# Patient Record
Sex: MALE | Race: BLACK OR AFRICAN AMERICAN | NOT HISPANIC OR LATINO | Employment: FULL TIME | URBAN - METROPOLITAN AREA
[De-identification: names, ages, dates, MRNs, and addresses within clinical notes are randomized per-mention and may not be internally consistent; named-entity substitution may affect disease eponyms.]

---

## 2024-11-03 ENCOUNTER — HOSPITAL ENCOUNTER (OUTPATIENT)
Facility: HOSPITAL | Age: 39
Discharge: LEFT BEFORE DISCHARGE | End: 2024-11-03
Attending: EMERGENCY MEDICINE | Admitting: HOSPITALIST
Payer: COMMERCIAL

## 2024-11-03 VITALS
RESPIRATION RATE: 16 BRPM | OXYGEN SATURATION: 98 % | TEMPERATURE: 98 F | DIASTOLIC BLOOD PRESSURE: 53 MMHG | HEART RATE: 73 BPM | HEIGHT: 73 IN | BODY MASS INDEX: 21.74 KG/M2 | SYSTOLIC BLOOD PRESSURE: 93 MMHG | WEIGHT: 164 LBS

## 2024-11-03 DIAGNOSIS — I21.3 STEMI (ST ELEVATION MYOCARDIAL INFARCTION): ICD-10-CM

## 2024-11-03 DIAGNOSIS — R07.9 CHEST PAIN: ICD-10-CM

## 2024-11-03 PROBLEM — E78.5 HLD (HYPERLIPIDEMIA): Chronic | Status: ACTIVE | Noted: 2024-11-03

## 2024-11-03 PROBLEM — E78.5 HLD (HYPERLIPIDEMIA): Status: ACTIVE | Noted: 2024-11-03

## 2024-11-03 PROBLEM — F31.60 BIPOLAR 1 DISORDER, MIXED: Status: ACTIVE | Noted: 2024-11-03

## 2024-11-03 PROBLEM — F31.60 BIPOLAR 1 DISORDER, MIXED: Chronic | Status: ACTIVE | Noted: 2024-11-03

## 2024-11-03 PROBLEM — B20 CURRENTLY ASYMPTOMATIC HIV INFECTION, WITH HISTORY OF HIV-RELATED ILLNESS: Status: ACTIVE | Noted: 2024-11-03

## 2024-11-03 PROBLEM — F20.9 SCHIZOPHRENIA: Chronic | Status: ACTIVE | Noted: 2024-11-03

## 2024-11-03 PROBLEM — F20.9 SCHIZOPHRENIA: Status: ACTIVE | Noted: 2024-11-03

## 2024-11-03 PROBLEM — B20 CURRENTLY ASYMPTOMATIC HIV INFECTION, WITH HISTORY OF HIV-RELATED ILLNESS: Chronic | Status: ACTIVE | Noted: 2024-11-03

## 2024-11-03 LAB
ABO + RH BLD: NORMAL
ALBUMIN SERPL BCP-MCNC: 4 G/DL (ref 3.5–5.2)
ALP SERPL-CCNC: 87 U/L (ref 40–150)
ALT SERPL W/O P-5'-P-CCNC: 32 U/L (ref 10–44)
AMPHET+METHAMPHET UR QL: NEGATIVE
ANION GAP SERPL CALC-SCNC: 11 MMOL/L (ref 8–16)
AST SERPL-CCNC: 30 U/L (ref 10–40)
BARBITURATES UR QL SCN>200 NG/ML: NEGATIVE
BASOPHILS # BLD AUTO: 0.08 K/UL (ref 0–0.2)
BASOPHILS NFR BLD: 1.1 % (ref 0–1.9)
BENZODIAZ UR QL SCN>200 NG/ML: NEGATIVE
BILIRUB SERPL-MCNC: 0.5 MG/DL (ref 0.1–1)
BILIRUB UR QL STRIP: NEGATIVE
BLD GP AB SCN CELLS X3 SERPL QL: NORMAL
BNP SERPL-MCNC: 12 PG/ML (ref 0–99)
BUN SERPL-MCNC: 8 MG/DL (ref 6–20)
BZE UR QL SCN: NEGATIVE
CALCIUM SERPL-MCNC: 10 MG/DL (ref 8.7–10.5)
CANNABINOIDS UR QL SCN: NEGATIVE
CHLORIDE SERPL-SCNC: 103 MMOL/L (ref 95–110)
CLARITY UR: CLEAR
CO2 SERPL-SCNC: 30 MMOL/L (ref 23–29)
COLOR UR: YELLOW
CREAT SERPL-MCNC: 1.3 MG/DL (ref 0.5–1.4)
CREAT UR-MCNC: 275 MG/DL (ref 23–375)
DIFFERENTIAL METHOD BLD: ABNORMAL
EOSINOPHIL # BLD AUTO: 0.2 K/UL (ref 0–0.5)
EOSINOPHIL NFR BLD: 2.7 % (ref 0–8)
ERYTHROCYTE [DISTWIDTH] IN BLOOD BY AUTOMATED COUNT: 17.4 % (ref 11.5–14.5)
EST. GFR  (NO RACE VARIABLE): >60 ML/MIN/1.73 M^2
ETHANOL SERPL-MCNC: <10 MG/DL
GLUCOSE SERPL-MCNC: 90 MG/DL (ref 70–110)
GLUCOSE UR QL STRIP: NEGATIVE
HCT VFR BLD AUTO: 46.2 % (ref 40–54)
HGB BLD-MCNC: 15.5 G/DL (ref 14–18)
HGB UR QL STRIP: NEGATIVE
IMM GRANULOCYTES # BLD AUTO: 0.01 K/UL (ref 0–0.04)
IMM GRANULOCYTES NFR BLD AUTO: 0.1 % (ref 0–0.5)
KETONES UR QL STRIP: NEGATIVE
LACTATE SERPL-SCNC: 2 MMOL/L (ref 0.5–2.2)
LEUKOCYTE ESTERASE UR QL STRIP: NEGATIVE
LYMPHOCYTES # BLD AUTO: 3.8 K/UL (ref 1–4.8)
LYMPHOCYTES NFR BLD: 54.2 % (ref 18–48)
MCH RBC QN AUTO: 28 PG (ref 27–31)
MCHC RBC AUTO-ENTMCNC: 33.5 G/DL (ref 32–36)
MCV RBC AUTO: 84 FL (ref 82–98)
METHADONE UR QL SCN>300 NG/ML: NEGATIVE
MONOCYTES # BLD AUTO: 0.6 K/UL (ref 0.3–1)
MONOCYTES NFR BLD: 8.9 % (ref 4–15)
NEUTROPHILS # BLD AUTO: 2.3 K/UL (ref 1.8–7.7)
NEUTROPHILS NFR BLD: 33 % (ref 38–73)
NITRITE UR QL STRIP: NEGATIVE
NRBC BLD-RTO: 0 /100 WBC
OPIATES UR QL SCN: NEGATIVE
PCP UR QL SCN>25 NG/ML: NEGATIVE
PH UR STRIP: 7 [PH] (ref 5–8)
PLATELET # BLD AUTO: 242 K/UL (ref 150–450)
PMV BLD AUTO: 9 FL (ref 9.2–12.9)
POTASSIUM SERPL-SCNC: 3.7 MMOL/L (ref 3.5–5.1)
PROT SERPL-MCNC: 7.1 G/DL (ref 6–8.4)
PROT UR QL STRIP: ABNORMAL
RBC # BLD AUTO: 5.53 M/UL (ref 4.6–6.2)
SODIUM SERPL-SCNC: 144 MMOL/L (ref 136–145)
SP GR UR STRIP: 1.02 (ref 1–1.03)
SPECIMEN OUTDATE: NORMAL
TOXICOLOGY INFORMATION: NORMAL
TROPONIN I SERPL DL<=0.01 NG/ML-MCNC: 0.01 NG/ML (ref 0–0.03)
TROPONIN I SERPL DL<=0.01 NG/ML-MCNC: <0.006 NG/ML (ref 0–0.03)
TROPONIN I SERPL DL<=0.01 NG/ML-MCNC: <0.006 NG/ML (ref 0–0.03)
URN SPEC COLLECT METH UR: ABNORMAL
UROBILINOGEN UR STRIP-ACNC: ABNORMAL EU/DL
WBC # BLD AUTO: 7.05 K/UL (ref 3.9–12.7)

## 2024-11-03 PROCEDURE — 83605 ASSAY OF LACTIC ACID: CPT | Performed by: EMERGENCY MEDICINE

## 2024-11-03 PROCEDURE — 86901 BLOOD TYPING SEROLOGIC RH(D): CPT | Performed by: EMERGENCY MEDICINE

## 2024-11-03 PROCEDURE — 93005 ELECTROCARDIOGRAM TRACING: CPT

## 2024-11-03 PROCEDURE — G0378 HOSPITAL OBSERVATION PER HR: HCPCS

## 2024-11-03 PROCEDURE — 36415 COLL VENOUS BLD VENIPUNCTURE: CPT | Performed by: HOSPITALIST

## 2024-11-03 PROCEDURE — 84484 ASSAY OF TROPONIN QUANT: CPT | Mod: 91 | Performed by: HOSPITALIST

## 2024-11-03 PROCEDURE — 82077 ASSAY SPEC XCP UR&BREATH IA: CPT | Performed by: EMERGENCY MEDICINE

## 2024-11-03 PROCEDURE — 80307 DRUG TEST PRSMV CHEM ANLYZR: CPT | Performed by: EMERGENCY MEDICINE

## 2024-11-03 PROCEDURE — 84484 ASSAY OF TROPONIN QUANT: CPT | Mod: 91 | Performed by: EMERGENCY MEDICINE

## 2024-11-03 PROCEDURE — 80053 COMPREHEN METABOLIC PANEL: CPT | Performed by: EMERGENCY MEDICINE

## 2024-11-03 PROCEDURE — 81003 URINALYSIS AUTO W/O SCOPE: CPT | Mod: 59 | Performed by: EMERGENCY MEDICINE

## 2024-11-03 PROCEDURE — 36415 COLL VENOUS BLD VENIPUNCTURE: CPT | Performed by: EMERGENCY MEDICINE

## 2024-11-03 PROCEDURE — 83880 ASSAY OF NATRIURETIC PEPTIDE: CPT | Performed by: EMERGENCY MEDICINE

## 2024-11-03 PROCEDURE — 93010 ELECTROCARDIOGRAM REPORT: CPT | Mod: ,,, | Performed by: INTERNAL MEDICINE

## 2024-11-03 PROCEDURE — 99285 EMERGENCY DEPT VISIT HI MDM: CPT | Mod: 25

## 2024-11-03 PROCEDURE — 85025 COMPLETE CBC W/AUTO DIFF WBC: CPT | Performed by: EMERGENCY MEDICINE

## 2024-11-03 PROCEDURE — 25000003 PHARM REV CODE 250: Performed by: EMERGENCY MEDICINE

## 2024-11-03 RX ORDER — ONDANSETRON HYDROCHLORIDE 2 MG/ML
4 INJECTION, SOLUTION INTRAVENOUS EVERY 8 HOURS PRN
Status: DISCONTINUED | OUTPATIENT
Start: 2024-11-03 | End: 2024-11-03 | Stop reason: HOSPADM

## 2024-11-03 RX ORDER — AMOXICILLIN 250 MG
1 CAPSULE ORAL 2 TIMES DAILY PRN
Status: DISCONTINUED | OUTPATIENT
Start: 2024-11-03 | End: 2024-11-03 | Stop reason: HOSPADM

## 2024-11-03 RX ORDER — ENOXAPARIN SODIUM 100 MG/ML
40 INJECTION SUBCUTANEOUS EVERY 24 HOURS
Status: DISCONTINUED | OUTPATIENT
Start: 2024-11-03 | End: 2024-11-03 | Stop reason: HOSPADM

## 2024-11-03 RX ORDER — DARUNAVIR, COBICISTAT, EMTRICITABINE, AND TENOFOVIR ALAFENAMIDE 800; 150; 200; 10 MG/1; MG/1; MG/1; MG/1
1 TABLET, FILM COATED ORAL DAILY
COMMUNITY

## 2024-11-03 RX ORDER — PROMETHAZINE HYDROCHLORIDE 25 MG/1
25 TABLET ORAL EVERY 6 HOURS PRN
Status: DISCONTINUED | OUTPATIENT
Start: 2024-11-03 | End: 2024-11-03 | Stop reason: HOSPADM

## 2024-11-03 RX ORDER — FERROUS SULFATE, DRIED 160(50) MG
1 TABLET, EXTENDED RELEASE ORAL 2 TIMES DAILY WITH MEALS
COMMUNITY

## 2024-11-03 RX ORDER — ASPIRIN 325 MG
325 TABLET ORAL
Status: DISCONTINUED | OUTPATIENT
Start: 2024-11-03 | End: 2024-11-03 | Stop reason: HOSPADM

## 2024-11-03 RX ORDER — ACETAMINOPHEN 325 MG/1
650 TABLET ORAL EVERY 8 HOURS PRN
Status: DISCONTINUED | OUTPATIENT
Start: 2024-11-03 | End: 2024-11-03 | Stop reason: HOSPADM

## 2024-11-03 RX ORDER — IBUPROFEN 200 MG
16 TABLET ORAL
Status: DISCONTINUED | OUTPATIENT
Start: 2024-11-03 | End: 2024-11-03 | Stop reason: HOSPADM

## 2024-11-03 RX ORDER — ACETAMINOPHEN 650 MG/1
650 SUPPOSITORY RECTAL EVERY 6 HOURS PRN
Status: DISCONTINUED | OUTPATIENT
Start: 2024-11-03 | End: 2024-11-03 | Stop reason: HOSPADM

## 2024-11-03 RX ORDER — GLUCAGON 1 MG
1 KIT INJECTION
Status: DISCONTINUED | OUTPATIENT
Start: 2024-11-03 | End: 2024-11-03 | Stop reason: HOSPADM

## 2024-11-03 RX ORDER — MORPHINE SULFATE 4 MG/ML
2 INJECTION, SOLUTION INTRAMUSCULAR; INTRAVENOUS EVERY 4 HOURS PRN
Status: DISCONTINUED | OUTPATIENT
Start: 2024-11-03 | End: 2024-11-03 | Stop reason: HOSPADM

## 2024-11-03 RX ORDER — HYDROCODONE BITARTRATE AND ACETAMINOPHEN 5; 325 MG/1; MG/1
1 TABLET ORAL EVERY 6 HOURS PRN
Status: DISCONTINUED | OUTPATIENT
Start: 2024-11-03 | End: 2024-11-03 | Stop reason: HOSPADM

## 2024-11-03 RX ORDER — IPRATROPIUM BROMIDE AND ALBUTEROL SULFATE 2.5; .5 MG/3ML; MG/3ML
3 SOLUTION RESPIRATORY (INHALATION) EVERY 6 HOURS PRN
Status: DISCONTINUED | OUTPATIENT
Start: 2024-11-03 | End: 2024-11-03 | Stop reason: HOSPADM

## 2024-11-03 RX ORDER — IBUPROFEN 200 MG
24 TABLET ORAL
Status: DISCONTINUED | OUTPATIENT
Start: 2024-11-03 | End: 2024-11-03 | Stop reason: HOSPADM

## 2024-11-03 RX ORDER — NALOXONE HCL 0.4 MG/ML
0.02 VIAL (ML) INJECTION
Status: DISCONTINUED | OUTPATIENT
Start: 2024-11-03 | End: 2024-11-03 | Stop reason: HOSPADM

## 2024-11-03 RX ORDER — TALC
6 POWDER (GRAM) TOPICAL NIGHTLY PRN
Status: DISCONTINUED | OUTPATIENT
Start: 2024-11-03 | End: 2024-11-03 | Stop reason: HOSPADM

## 2024-11-03 RX ORDER — ALUMINUM HYDROXIDE, MAGNESIUM HYDROXIDE, AND SIMETHICONE 1200; 120; 1200 MG/30ML; MG/30ML; MG/30ML
30 SUSPENSION ORAL 4 TIMES DAILY PRN
Status: DISCONTINUED | OUTPATIENT
Start: 2024-11-03 | End: 2024-11-03 | Stop reason: HOSPADM

## 2024-11-03 RX ORDER — RISPERIDONE 120 MG
120 KIT SUBCUTANEOUS
COMMUNITY
Start: 2024-06-14

## 2024-11-03 RX ORDER — SODIUM CHLORIDE 0.9 % (FLUSH) 0.9 %
10 SYRINGE (ML) INJECTION EVERY 12 HOURS PRN
Status: DISCONTINUED | OUTPATIENT
Start: 2024-11-03 | End: 2024-11-03 | Stop reason: HOSPADM

## 2024-11-03 RX ADMIN — NITROGLYCERIN 1 INCH: 20 OINTMENT TOPICAL at 04:11

## 2024-11-03 NOTE — ASSESSMENT & PLAN NOTE
Patient currently asymptomatic. Follows with provider outpatient. Last clinic note from 5/7/24 as noted below.  Plan:  -current asymptomatic HIV infection, no obvious HIV-associated opportunistic infections.    -He does have a history of AIDS, reports that he was last reportedly undetectable 2 months ago.    -Will continue home Sym[employer].

## 2024-11-03 NOTE — ASSESSMENT & PLAN NOTE
Patient presented with substernal chest pain initially activating code STEMI in the ED. Cardiology consulted and review EKG and canceled code STEMI as chest pain improved with conservative management and troponin negative x1.  Recommendations were to obtain UDS given previous drug history and admitted for continue cardiac monitoring inpatient will be evaluated in the morning.  Patient currently chest pain-free.  Repeat troponin pending.  Chest pain likely secondary to drinking numerous monster energy drinks has reported at bedside.  Plan:  -NPO  -telemetry  -trend troponin   -serial EKGs at onset/worsening chest pain  -ELIEL therapy prn  -f/u cardiology

## 2024-11-03 NOTE — HPI
Paul Goss Jr. is a 39 y.o. male with a PMH  has a past medical history of Anxiety disorder, unspecified, Bipolar disorder, Depression, Human immunodeficiency virus (HIV) disease, and Paranoid schizophrenia. who presented to the ED for further evaluation of acute onset substernal/left-sided chest pain which began prior to arrival.  Patient reported drinking a proximally 6 large monster energy drinks throughout the day and one prior to going to sleep and stated the pain woke him up from his sleep. Pain was described as sharp/stabbing in nature, constant, located midsternally and left parasternal without radiation, rated 7/10 in severity, with no known alleviating or aggravating factors noted.  Patient reported experiencing similar symptoms back in December 20, 2023 and stated he underwent cardiac workup at [facility] and was told his heart was normal.  Patient also reported prior history of crack cocaine abuse with last use approximately 6 months prior to admission.  Patient continues to smoke a proximally 2 pack cigarettes daily and states noncompliance with his home medications however does take his monthly injections for treatment of schizophrenia.  Prior to onset of symptoms, patient reported being in his usual state of health with no concerns or complaints.  All other review of systems negative except as noted above.  Initial workup in the ED fairly unremarkable with patient remaining hemodynamically stable, afebrile without leukocytosis, CBC and CMP within normal limits.  Troponin negative x1.  EKG initially concerning for STEMI however cardiology consulted and canceled code STEMI with recommendations to obtain UDS given prior history of drug abuse as well as admission for continued cardiac observation and treatment with nitro, benzos, and ELIEL therapy for chest pain. Patient admitted to Hospital Medicine under observation and awaiting further evaluation/recommendations from Cardiology  in the morning.    PCP: No, Primary Doctor

## 2024-11-03 NOTE — ASSESSMENT & PLAN NOTE
Patient with known history of bipolar disorder and schizophrenia follows outpatient with Psychiatry as noted below.  Patient currently without suicidal/homicidal ideations and reports he does not take below listed medications however does continued to take his monthly injections.      Jessica Madden NP on 5/29/24  1.Continue trileptal 600 mg po bid  2. Continue Perseris 120 mg IM q 28 days  3. Continue trazodone 100 mg po q hs  4. Discontinue effexor xr 37.5 mg po q daily  5. Continue melatonin 10 mg po q hs  6. Continue vistaril 25 mg bid prn anxiety    Plan:  -continue to monitor   -f/u outpatient as directed

## 2024-11-03 NOTE — ASSESSMENT & PLAN NOTE
Patient is chronically on statin but non-compliant.will not continue for now. Last Lipid Panel:   Lab Results   Component Value Date    CHOL 143 03/25/2024    HDL 59 (L) 03/25/2024    LDLCALC 70 03/25/2024    TRIG 72 03/25/2024   Plan:  -low fat/low calorie diet

## 2024-11-03 NOTE — ED PROVIDER NOTES
SCRIBE #1 NOTE: I, Lily Quiroz, am scribing for, and in the presence of, Carlos Greenberg Jr., MD. I have scribed the entire note.       History     Chief Complaint   Patient presents with    Chest Pain     Pt c/o midsternal CP that woke him up, Hx MI. Denies SOB. Received 325mg ASA enroute      Review of patient's allergies indicates:  No Known Allergies      History of Present Illness     HPI    11/3/2024, 4:13 AM  History obtained from the patient      History of Present Illness: Paul Goss Jr. is a 39 y.o. male patient with a PMHx of paranoid schizophrenia, bipolar disorder, anxiety, depression, HIV, substance abuse, and MI who presents to the Emergency Department for evaluation of L-sided CP which onset PTA. Pt states that his CP woke him up. He describes his pain as sharp, nonradiating, and 7/10. Pt reports that he had a heart attack in December 2023. He is 6 months clean of crack cocaine. Pt smokes 2 ppd. No mitigating or exacerbating factors reported. No associated sxs reported. Patient denies any SOB, N/V, fever, and all other sxs at this time. Prior Tx includes 325 mg ASA en route. No further complaints or concerns at this time.       Arrival mode: Ambulance Service    PCP: No, Primary Doctor        Past Medical History:  Past Medical History:   Diagnosis Date    Anxiety disorder, unspecified     Bipolar disorder     Depression     Human immunodeficiency virus (HIV) disease     Paranoid schizophrenia        Past Surgical History:  History reviewed. No pertinent surgical history.      Family History:  No family history on file.    Social History:  Social History     Tobacco Use    Smoking status: Passive Smoke Exposure - Never Smoker    Smokeless tobacco: Not on file   Substance and Sexual Activity    Alcohol use: Yes     Comment: occasionally    Drug use: Yes     Types: Marijuana    Sexual activity: Yes        Review of Systems     Review of Systems   Constitutional:  Negative for fever.    HENT:  Negative for sore throat.    Respiratory:  Negative for shortness of breath.    Cardiovascular:  Positive for chest pain (sharp, nonradiating, 7/10).   Gastrointestinal:  Negative for nausea and vomiting.   Genitourinary:  Negative for dysuria.   Musculoskeletal:  Negative for back pain.   Skin:  Negative for rash.   Neurological:  Negative for weakness.   Hematological:  Does not bruise/bleed easily.      Physical Exam     Initial Vitals [11/03/24 0349]   BP Pulse Resp Temp SpO2   128/86 80 18 98.2 °F (36.8 °C) 100 %      MAP       --          Physical Exam  Nursing Notes and Vital Signs Reviewed.  Constitutional: Patient is in no acute distress. Well-developed and well-nourished.  Head: Atraumatic. Normocephalic.  Eyes: PERRL. EOM intact. Conjunctivae are not pale. No scleral icterus.  ENT: Mucous membranes are moist. Oropharynx is clear and symmetric.    Neck: Supple. Full ROM. No lymphadenopathy.  Cardiovascular: Regular rate. Regular rhythm. No murmurs, rubs, or gallops. Distal pulses are 2+ and symmetric.  Pulmonary/Chest: No respiratory distress. Clear to auscultation bilaterally. No wheezing or rales. L-sided chest wall tenderness.  Abdominal: Soft and non-distended.  There is no tenderness.  No rebound, guarding, or rigidity. Good bowel sounds.  Genitourinary: No CVA tenderness  Musculoskeletal: Moves all extremities. No obvious deformities. No edema. No calf tenderness.  Skin: Warm and dry.  Neurological:  Alert, awake, and appropriate.  Normal speech.  No acute focal neurological deficits are appreciated.  Psychiatric: Normal affect. Good eye contact. Appropriate in content.     ED Course   Critical Care    Date/Time: 11/3/2024 5:14 AM    Performed by: Carlos Greenberg Jr., MD  Authorized by: Carlos Greenberg Jr., MD  Direct patient critical care time: 25 minutes  Additional history critical care time: 15 minutes  Ordering / reviewing critical care time: 10 minutes  Documentation critical care time:  "10 minutes  Consulting other physicians critical care time: 15 minutes  Total critical care time (exclusive of procedural time) : 75 minutes  Critical care time was exclusive of separately billable procedures and treating other patients and teaching time.  Critical care was necessary to treat or prevent imminent or life-threatening deterioration of the following conditions: chest pain.  Critical care was time spent personally by me on the following activities: blood draw for specimens, development of treatment plan with patient or surrogate, discussions with consultants, interpretation of cardiac output measurements, evaluation of patient's response to treatment, examination of patient, obtaining history from patient or surrogate, ordering and performing treatments and interventions, ordering and review of laboratory studies, ordering and review of radiographic studies, pulse oximetry, re-evaluation of patient's condition and review of old charts.        ED Vital Signs:  Vitals:    11/03/24 0349 11/03/24 0402 11/03/24 0404 11/03/24 0418   BP: 128/86 121/80  117/75   Pulse: 80 75  78   Resp: 18 19  20   Temp: 98.2 °F (36.8 °C)      TempSrc: Oral      SpO2: 100% 100%  100%   Weight:   75.6 kg (166 lb 11.2 oz)    Height: 6' (1.829 m)  6' 1" (1.854 m)     11/03/24 0502 11/03/24 0518 11/03/24 0532 11/03/24 0626   BP: 112/73 110/69 106/67 111/64   Pulse: 73 74 71 76   Resp: 17 19 13 16   Temp:    98.1 °F (36.7 °C)   TempSrc:       SpO2: 100% 100% 99% 99%   Weight:       Height:        11/03/24 0628 11/03/24 0656 11/03/24 0702   BP:   (!) 93/53   Pulse: 81  73   Resp:   16   Temp:   97.9 °F (36.6 °C)   TempSrc:   Oral   SpO2:   98%   Weight:  74.4 kg (164 lb 0.4 oz)    Height:          Abnormal Lab Results:  Labs Reviewed   CBC W/ AUTO DIFFERENTIAL - Abnormal       Result Value    WBC 7.05      RBC 5.53      Hemoglobin 15.5      Hematocrit 46.2      MCV 84      MCH 28.0      MCHC 33.5      RDW 17.4 (*)     Platelets 242   "    MPV 9.0 (*)     Immature Granulocytes 0.1      Gran # (ANC) 2.3      Immature Grans (Abs) 0.01      Lymph # 3.8      Mono # 0.6      Eos # 0.2      Baso # 0.08      nRBC 0      Gran % 33.0 (*)     Lymph % 54.2 (*)     Mono % 8.9      Eosinophil % 2.7      Basophil % 1.1      Differential Method Automated     COMPREHENSIVE METABOLIC PANEL - Abnormal    Sodium 144      Potassium 3.7      Chloride 103      CO2 30 (*)     Glucose 90      BUN 8      Creatinine 1.3      Calcium 10.0      Total Protein 7.1      Albumin 4.0      Total Bilirubin 0.5      Alkaline Phosphatase 87      AST 30      ALT 32      eGFR >60      Anion Gap 11     URINALYSIS, REFLEX TO URINE CULTURE - Abnormal    Specimen UA Urine, Clean Catch      Color, UA Yellow      Appearance, UA Clear      pH, UA 7.0      Specific Gravity, UA 1.025      Protein, UA Trace (*)     Glucose, UA Negative      Ketones, UA Negative      Bilirubin (UA) Negative      Occult Blood UA Negative      Nitrite, UA Negative      Urobilinogen, UA 2.0-3.0 (*)     Leukocytes, UA Negative      Narrative:     Specimen Source->Urine   TROPONIN I    Troponin I <0.006     TROPONIN I    Troponin I <0.006     B-TYPE NATRIURETIC PEPTIDE    BNP 12     LACTIC ACID, PLASMA    Lactate (Lactic Acid) 2.0     ALCOHOL,MEDICAL (ETHANOL)    Alcohol, Serum <10     DRUG SCREEN PANEL, URINE EMERGENCY    Benzodiazepines Negative      Methadone metabolites Negative      Cocaine (Metab.) Negative      Opiate Scrn, Ur Negative      Barbiturate Screen, Ur Negative      Amphetamine Screen, Ur Negative      THC Negative      Phencyclidine Negative      Creatinine, Urine 275.0      Toxicology Information SEE COMMENT      Narrative:     Specimen Source->Urine   TYPE & SCREEN    Group & Rh AB POS      Indirect Susana NEG      Specimen Outdate 11/06/2024 23:59          All Lab Results:  Results for orders placed or performed during the hospital encounter of 11/03/24   CBC auto differential    Collection Time:  11/03/24  3:59 AM   Result Value Ref Range    WBC 7.05 3.90 - 12.70 K/uL    RBC 5.53 4.60 - 6.20 M/uL    Hemoglobin 15.5 14.0 - 18.0 g/dL    Hematocrit 46.2 40.0 - 54.0 %    MCV 84 82 - 98 fL    MCH 28.0 27.0 - 31.0 pg    MCHC 33.5 32.0 - 36.0 g/dL    RDW 17.4 (H) 11.5 - 14.5 %    Platelets 242 150 - 450 K/uL    MPV 9.0 (L) 9.2 - 12.9 fL    Immature Granulocytes 0.1 0.0 - 0.5 %    Gran # (ANC) 2.3 1.8 - 7.7 K/uL    Immature Grans (Abs) 0.01 0.00 - 0.04 K/uL    Lymph # 3.8 1.0 - 4.8 K/uL    Mono # 0.6 0.3 - 1.0 K/uL    Eos # 0.2 0.0 - 0.5 K/uL    Baso # 0.08 0.00 - 0.20 K/uL    nRBC 0 0 /100 WBC    Gran % 33.0 (L) 38.0 - 73.0 %    Lymph % 54.2 (H) 18.0 - 48.0 %    Mono % 8.9 4.0 - 15.0 %    Eosinophil % 2.7 0.0 - 8.0 %    Basophil % 1.1 0.0 - 1.9 %    Differential Method Automated    Comprehensive metabolic panel    Collection Time: 11/03/24  3:59 AM   Result Value Ref Range    Sodium 144 136 - 145 mmol/L    Potassium 3.7 3.5 - 5.1 mmol/L    Chloride 103 95 - 110 mmol/L    CO2 30 (H) 23 - 29 mmol/L    Glucose 90 70 - 110 mg/dL    BUN 8 6 - 20 mg/dL    Creatinine 1.3 0.5 - 1.4 mg/dL    Calcium 10.0 8.7 - 10.5 mg/dL    Total Protein 7.1 6.0 - 8.4 g/dL    Albumin 4.0 3.5 - 5.2 g/dL    Total Bilirubin 0.5 0.1 - 1.0 mg/dL    Alkaline Phosphatase 87 40 - 150 U/L    AST 30 10 - 40 U/L    ALT 32 10 - 44 U/L    eGFR >60 >60 mL/min/1.73 m^2    Anion Gap 11 8 - 16 mmol/L   Troponin I #1    Collection Time: 11/03/24  3:59 AM   Result Value Ref Range    Troponin I <0.006 0.000 - 0.026 ng/mL   Troponin I #2    Collection Time: 11/03/24  3:59 AM   Result Value Ref Range    Troponin I <0.006 0.000 - 0.026 ng/mL   BNP    Collection Time: 11/03/24  3:59 AM   Result Value Ref Range    BNP 12 0 - 99 pg/mL   Lactic Acid, Plasma    Collection Time: 11/03/24  4:25 AM   Result Value Ref Range    Lactate (Lactic Acid) 2.0 0.5 - 2.2 mmol/L   Ethanol    Collection Time: 11/03/24  4:25 AM   Result Value Ref Range    Alcohol, Serum <10 <10 mg/dL    Type & Screen    Collection Time: 11/03/24  4:25 AM   Result Value Ref Range    Group & Rh AB POS     Indirect Susana NEG     Specimen Outdate 11/06/2024 23:59    Urinalysis, Reflex to Urine Culture Urine, Clean Catch    Collection Time: 11/03/24  4:43 AM    Specimen: Urine   Result Value Ref Range    Specimen UA Urine, Clean Catch     Color, UA Yellow Yellow, Straw, Tasia    Appearance, UA Clear Clear    pH, UA 7.0 5.0 - 8.0    Specific Gravity, UA 1.025 1.005 - 1.030    Protein, UA Trace (A) Negative    Glucose, UA Negative Negative    Ketones, UA Negative Negative    Bilirubin (UA) Negative Negative    Occult Blood UA Negative Negative    Nitrite, UA Negative Negative    Urobilinogen, UA 2.0-3.0 (A) <2.0 EU/dL    Leukocytes, UA Negative Negative   Drug screen panel, in-house    Collection Time: 11/03/24  4:43 AM   Result Value Ref Range    Benzodiazepines Negative Negative    Methadone metabolites Negative Negative    Cocaine (Metab.) Negative Negative    Opiate Scrn, Ur Negative Negative    Barbiturate Screen, Ur Negative Negative    Amphetamine Screen, Ur Negative Negative    THC Negative Negative    Phencyclidine Negative Negative    Creatinine, Urine 275.0 23.0 - 375.0 mg/dL    Toxicology Information SEE COMMENT    EKG 12-lead    Collection Time: 11/03/24  5:11 AM   Result Value Ref Range    QRS Duration 84 ms    [facility] QTC Calculation 433 ms   Troponin I    Collection Time: 11/03/24  7:58 AM   Result Value Ref Range    Troponin I 0.008 0.000 - 0.026 ng/mL        Imaging Results:  Imaging Results              X-Ray Chest AP Portable (Final result)  Result time 11/03/24 07:41:03      Final result by Misha Manning MD (Timothy) (11/03/24 07:41:03)                   Impression:      Negative single view chest x-ray.      Electronically signed by: Misha Manning MD  Date:    11/03/2024  Time:    07:41               Narrative:    EXAMINATION:  XR CHEST AP PORTABLE    CLINICAL HISTORY:  <Diagnosis>, Chest  Pain;    COMPARISON:  None    FINDINGS:  Heart size is normal. The lung fields are clear. No acute cardiopulmonary infiltrate.                                       The EKG was ordered, reviewed, and independently interpreted by the ED provider.  Interpretation time: 4:11  Rate: 75 BPM  Rhythm: normal sinus rhythm  Interpretation: No acute ST changes. No STEMI.           The Emergency Provider reviewed the vital signs and test results, which are outlined above.     ED Discussion     4:31 AM: Discussed pt's case with Dr. Bates (Interventional Cardiology) who reviewed pt's previous EKG and recommends cancelling the code STEMI. He recommends aspirin, nitro, and testing pt's troponin level.     5:09 AM: Discussed case with Dr. Gustafson (Hospital Medicine). Dr. Gustafson agrees with current care and management of pt and accepts admission.   Admitting Service: Hospital Medicine  Admitting Physician: Dr. Gustafson  Admit to: obs med/tele     40yo M hx MI 6 months ago, c/o left sided chest pain that began this am and woke him up from sleep. EKG shows anterolateral st elevation similar to previous EKG. cxr: naf. trop: 0.006. discussed c Dr. Bates, no cath lab intervention at this point in time. recommends trend troponin and will see pt in consult.  admission/obs for chest pain r/o mi      Medical Decision Making  Amount and/or Complexity of Data Reviewed  Labs: ordered. Decision-making details documented in ED Course.  Radiology: ordered and independent interpretation performed. Decision-making details documented in ED Course.  ECG/medicine tests: ordered and independent interpretation performed. Decision-making details documented in ED Course.    Risk  OTC drugs.  Prescription drug management.  Parenteral controlled substances.  Decision regarding hospitalization.  Risk Details: OTC drugs, prescription drugs and controlled substances considered.  Due to patient's symptoms improving and pain controlled pain medications  ordered appropriately.  DDX: MI, CAD, PUlmonary disease, PE, AAA, Pneumonia, Costochondritis, PTX, Liver disease, Pancreatitis                  ED Medication(s):  Medications   nitroGLYCERIN 2% TD oint ointment 1 inch (1 inch Topical (Top) Given 11/3/24 0408)       Discharge Medication List as of 11/3/2024  9:07 AM                  Scribe Attestation:   Scribe #1: I performed the above scribed service and the documentation accurately describes the services I performed. I attest to the accuracy of the note.     Attending:   Physician Attestation Statement for Scribe #1: I, Carlos Greenberg Jr., MD, personally performed the services described in this documentation, as scribed by Lily Quiroz, in my presence, and it is both accurate and complete.       Scribe Attestation:   Scribe #1: I performed the above scribed service and the documentation accurately describes the services I performed. I attest to the accuracy of the note.         Clinical Impression       ICD-10-CM ICD-9-CM   1. Chest pain  R07.9 786.50   2. STEMI (ST elevation myocardial infarction)  I21.3 410.90       Disposition:   Disposition: Placed in Observation  Condition: Carlos Todd Jr., MD  11/04/24 6178

## 2024-11-03 NOTE — HOSPITAL COURSE
The patient presented with chest pain. EKG initially concerning for STEMI however cardiology consulted and canceled code STEMI with recommendations to obtain UDS given prior history of drug abuse as well as admission for continued cardiac observation and treatment with nitro, benzos, and ELIEL therapy for chest pain. Troponin negative.  Patient left AMA.

## 2024-11-03 NOTE — SUBJECTIVE & OBJECTIVE
Past Medical History:   Diagnosis Date    Anxiety disorder, unspecified     Bipolar disorder     Depression     Human immunodeficiency virus (HIV) disease     Paranoid schizophrenia        History reviewed. No pertinent surgical history.    Review of patient's allergies indicates:  No Known Allergies    No current facility-administered medications on file prior to encounter.     Current Outpatient Medications on File Prior to Encounter   Medication Sig    darunavir-naina-emtri-tenof ala (SYMTUZA) [phone] mg Tab Take 1 tablet by mouth once daily. 800mg    risperiDONE (PERSERIS) 120 mg sers Inject 120 mg into the skin every 28 days.    calcium-vitamin D3 (CALCIUM 500 + D) 500 mg-5 mcg (200 unit) per tablet Take 1 tablet by mouth 2 (two) times daily with meals.    diphenhydrAMINE (BENADRYL) 25 mg capsule Take 25 mg by mouth every 6 (six) hours as needed.    haloperidol (HALDOL) 5 MG tablet Take 5 mg by mouth 4 (four) times daily.     Family History    None       Tobacco Use    Smoking status: Passive Smoke Exposure - Never Smoker    Smokeless tobacco: Not on file   Substance and Sexual Activity    Alcohol use: Yes     Comment: occasionally    Drug use: Yes     Types: Marijuana    Sexual activity: Yes     Review of Systems   All other systems reviewed and are negative.    Objective:     Vital Signs (Most Recent):  Temp: 98.2 °F (36.8 °C) (11/03/24 0349)  Pulse: 74 (11/03/24 0518)  Resp: 19 (11/03/24 0518)  BP: 110/69 (11/03/24 0518)  SpO2: 100 % (11/03/24 0518) Vital Signs (24h Range):  Temp:  [98.2 °F (36.8 °C)] 98.2 °F (36.8 °C)  Pulse:  [73-80] 74  Resp:  [17-20] 19  SpO2:  [100 %] 100 %  BP: (110-128)/(69-86) 110/69     Weight: 75.6 kg (166 lb 11.2 oz)  Body mass index is 21.99 kg/m².     Physical Exam  Vitals reviewed.   Constitutional:       General: He is not in acute distress.     Appearance: Normal appearance. He is normal weight. He is not ill-appearing, toxic-appearing or diaphoretic.   HENT:      Head:  Normocephalic and atraumatic.      Right Ear: External ear normal.      Left Ear: External ear normal.      Nose: Nose normal. No congestion or rhinorrhea.      Mouth/Throat:      Mouth: Mucous membranes are moist.      Pharynx: Oropharynx is clear. No oropharyngeal exudate or posterior oropharyngeal erythema.   Eyes:      General: No scleral icterus.     Extraocular Movements: Extraocular movements intact.      Conjunctiva/sclera: Conjunctivae normal.      Pupils: Pupils are equal, round, and reactive to light.   Neck:      Vascular: No carotid bruit.   Cardiovascular:      Rate and Rhythm: Normal rate and regular rhythm.      Pulses: Normal pulses.      Heart sounds: Normal heart sounds. No murmur heard.     No friction rub. No gallop.   Pulmonary:      Effort: Pulmonary effort is normal. No respiratory distress.      Breath sounds: Normal breath sounds. No stridor. No wheezing, rhonchi or rales.   Chest:      Chest wall: No tenderness.   Abdominal:      General: Abdomen is flat. Bowel sounds are normal. There is no distension.      Palpations: Abdomen is soft. There is no mass.      Tenderness: There is no abdominal tenderness. There is no guarding or rebound.      Hernia: No hernia is present.   Musculoskeletal:         General: No swelling, tenderness, deformity or signs of injury. Normal range of motion.      Cervical back: Normal range of motion and neck supple. No rigidity or tenderness.   Lymphadenopathy:      Cervical: No cervical adenopathy.   Skin:     General: Skin is warm and dry.      Capillary Refill: Capillary refill takes less than 2 seconds.      Coloration: Skin is not jaundiced or pale.      Findings: No bruising, erythema, lesion or rash.   Neurological:      General: No focal deficit present.      Mental Status: He is alert and oriented to person, place, and time. Mental status is at baseline.      Cranial Nerves: No cranial nerve deficit.      Sensory: No sensory deficit.      Motor: No  weakness.      Coordination: Coordination normal.   Psychiatric:         Mood and Affect: Mood normal.         Behavior: Behavior normal.         Thought Content: Thought content normal.         Judgment: Judgment normal.              CRANIAL NERVES     CN III, IV, VI   Pupils are equal, round, and reactive to light.       Significant Labs: All pertinent labs within the past 24 hours have been reviewed.    Significant Imaging: I have reviewed all pertinent imaging results/findings within the past 24 hours.    LABS:  Recent Results (from the past 24 hours)   CBC auto differential    Collection Time: 11/03/24  3:59 AM   Result Value Ref Range    WBC 7.05 3.90 - 12.70 K/uL    RBC 5.53 4.60 - 6.20 M/uL    Hemoglobin 15.5 14.0 - 18.0 g/dL    Hematocrit 46.2 40.0 - 54.0 %    MCV 84 82 - 98 fL    MCH 28.0 27.0 - 31.0 pg    MCHC 33.5 32.0 - 36.0 g/dL    RDW 17.4 (H) 11.5 - 14.5 %    Platelets 242 150 - 450 K/uL    MPV 9.0 (L) 9.2 - 12.9 fL    Immature Granulocytes 0.1 0.0 - 0.5 %    Gran # (ANC) 2.3 1.8 - 7.7 K/uL    Immature Grans (Abs) 0.01 0.00 - 0.04 K/uL    Lymph # 3.8 1.0 - 4.8 K/uL    Mono # 0.6 0.3 - 1.0 K/uL    Eos # 0.2 0.0 - 0.5 K/uL    Baso # 0.08 0.00 - 0.20 K/uL    nRBC 0 0 /100 WBC    Gran % 33.0 (L) 38.0 - 73.0 %    Lymph % 54.2 (H) 18.0 - 48.0 %    Mono % 8.9 4.0 - 15.0 %    Eosinophil % 2.7 0.0 - 8.0 %    Basophil % 1.1 0.0 - 1.9 %    Differential Method Automated    Comprehensive metabolic panel    Collection Time: 11/03/24  3:59 AM   Result Value Ref Range    Sodium 144 136 - 145 mmol/L    Potassium 3.7 3.5 - 5.1 mmol/L    Chloride 103 95 - 110 mmol/L    CO2 30 (H) 23 - 29 mmol/L    Glucose 90 70 - 110 mg/dL    BUN 8 6 - 20 mg/dL    Creatinine 1.3 0.5 - 1.4 mg/dL    Calcium 10.0 8.7 - 10.5 mg/dL    Total Protein 7.1 6.0 - 8.4 g/dL    Albumin 4.0 3.5 - 5.2 g/dL    Total Bilirubin 0.5 0.1 - 1.0 mg/dL    Alkaline Phosphatase 87 40 - 150 U/L    AST 30 10 - 40 U/L    ALT 32 10 - 44 U/L    eGFR >60 >60  mL/min/1.73 m^2    Anion Gap 11 8 - 16 mmol/L   Troponin I #1    Collection Time: 11/03/24  3:59 AM   Result Value Ref Range    Troponin I <0.006 0.000 - 0.026 ng/mL   Troponin I #2    Collection Time: 11/03/24  3:59 AM   Result Value Ref Range    Troponin I <0.006 0.000 - 0.026 ng/mL   BNP    Collection Time: 11/03/24  3:59 AM   Result Value Ref Range    BNP 12 0 - 99 pg/mL   Lactic Acid, Plasma    Collection Time: 11/03/24  4:25 AM   Result Value Ref Range    Lactate (Lactic Acid) 2.0 0.5 - 2.2 mmol/L   Type & Screen    Collection Time: 11/03/24  4:25 AM   Result Value Ref Range    Group & Rh AB POS     Indirect Susana NEG     Specimen Outdate 11/06/2024 23:59    Ethanol    Collection Time: 11/03/24  4:25 AM   Result Value Ref Range    Alcohol, Serum <10 <10 mg/dL   Urinalysis, Reflex to Urine Culture Urine, Clean Catch    Collection Time: 11/03/24  4:43 AM    Specimen: Urine   Result Value Ref Range    Specimen UA Urine, Clean Catch     Color, UA Yellow Yellow, Straw, Tasia    Appearance, UA Clear Clear    pH, UA 7.0 5.0 - 8.0    Specific Gravity, UA 1.025 1.005 - 1.030    Protein, UA Trace (A) Negative    Glucose, UA Negative Negative    Ketones, UA Negative Negative    Bilirubin (UA) Negative Negative    Occult Blood UA Negative Negative    Nitrite, UA Negative Negative    Urobilinogen, UA 2.0-3.0 (A) <2.0 EU/dL    Leukocytes, UA Negative Negative   Drug screen panel, in-house    Collection Time: 11/03/24  4:43 AM   Result Value Ref Range    Benzodiazepines Negative Negative    Methadone metabolites Negative Negative    Cocaine (Metab.) Negative Negative    Opiate Scrn, Ur Negative Negative    Barbiturate Screen, Ur Negative Negative    Amphetamine Screen, Ur Negative Negative    THC Negative Negative    Phencyclidine Negative Negative    Creatinine, Urine 275.0 23.0 - 375.0 mg/dL    Toxicology Information SEE COMMENT        RADIOLOGY  No results found.    EKG    MICROBIOLOGY    MDM

## 2024-11-03 NOTE — CARE UPDATE
EKG reviewed, early repolarization no reciprocal changes, unchanged when compared to EKG from 4/12/2024 under media in epic done at [facility] under scan 4/12/2024 external cardiology  Stemi code cancelled  Check UDS  Use nitro and benzo , history of cocaine drug use  Spoke to Dr Greenberg

## 2024-11-03 NOTE — H&P
[facility] - Emergency Dept.  [facility]  History & Physical    Patient Name: Paul Goss Jr.  MRN: [id number]  Patient Class: OP- Observation  Admission Date: 11/3/2024  Attending Physician: Leandro Gustafson MD   Primary Care Provider: Sophia Primary Doctor         Patient information was obtained from patient, past medical records, and ER records.     Subjective:     Principal Problem:Chest pain    Chief Complaint:   Chief Complaint   Patient presents with    Chest Pain     Pt c/o midsternal CP that woke him up, Hx MI. Denies SOB. Received 325mg ASA enroute         HPI: Paul Goss Jr. is a 39 y.o. male with a PMH  has a past medical history of Anxiety disorder, unspecified, Bipolar disorder, Depression, Human immunodeficiency virus (HIV) disease, and Paranoid schizophrenia. who presented to the ED for further evaluation of acute onset substernal/left-sided chest pain which began prior to arrival.  Patient reported drinking a proximally 6 large monster energy drinks throughout the day and one prior to going to sleep and stated the pain woke him up from his sleep. Pain was described as sharp/stabbing in nature, constant, located midsternally and left parasternal without radiation, rated 7/10 in severity, with no known alleviating or aggravating factors noted.  Patient reported experiencing similar symptoms back in December 20, 2023 and stated he underwent cardiac workup at [facility] and was told his heart was normal.  Patient also reported prior history of crack cocaine abuse with last use approximately 6 months prior to admission.  Patient continues to smoke a proximally 2 pack cigarettes daily and states noncompliance with his home medications however does take his monthly injections for treatment of schizophrenia.  Prior to onset of symptoms, patient reported being in his usual state of health with no concerns or complaints.  All other review of systems negative except as  noted above.  Initial workup in the ED fairly unremarkable with patient remaining hemodynamically stable, afebrile without leukocytosis, CBC and CMP within normal limits.  Troponin negative x1.  EKG initially concerning for STEMI however cardiology consulted and canceled code STEMI with recommendations to obtain UDS given prior history of drug abuse as well as admission for continued cardiac observation and treatment with nitro, benzos, and ELIEL therapy for chest pain. Patient admitted to Hospital Medicine under observation and awaiting further evaluation/recommendations from Cardiology in the morning.    PCP: No, Primary Doctor       Past Medical History:   Diagnosis Date    Anxiety disorder, unspecified     Bipolar disorder     Depression     Human immunodeficiency virus (HIV) disease     Paranoid schizophrenia        History reviewed. No pertinent surgical history.    Review of patient's allergies indicates:  No Known Allergies    No current facility-administered medications on file prior to encounter.     Current Outpatient Medications on File Prior to Encounter   Medication Sig    darunavir-naina-emtri-tenof ala (SYMTUZA) [id number] mg Tab Take 1 tablet by mouth once daily. 800mg    risperiDONE (PERSERIS) 120 mg sers Inject 120 mg into the skin every 28 days.    calcium-vitamin D3 (CALCIUM 500 + D) 500 mg-5 mcg (200 unit) per tablet Take 1 tablet by mouth 2 (two) times daily with meals.    diphenhydrAMINE (BENADRYL) 25 mg capsule Take 25 mg by mouth every 6 (six) hours as needed.    haloperidol (HALDOL) 5 MG tablet Take 5 mg by mouth 4 (four) times daily.     Family History    None       Tobacco Use    Smoking status: Passive Smoke Exposure - Never Smoker    Smokeless tobacco: Not on file   Substance and Sexual Activity    Alcohol use: Yes     Comment: occasionally    Drug use: Yes     Types: Marijuana    Sexual activity: Yes     Review of Systems   All other systems reviewed and are negative.    Objective:      Vital Signs (Most Recent):  Temp: 98.2 °F (36.8 °C) (11/03/24 0349)  Pulse: 74 (11/03/24 0518)  Resp: 19 (11/03/24 0518)  BP: 110/69 (11/03/24 0518)  SpO2: 100 % (11/03/24 0518) Vital Signs (24h Range):  Temp:  [98.2 °F (36.8 °C)] 98.2 °F (36.8 °C)  Pulse:  [73-80] 74  Resp:  [17-20] 19  SpO2:  [100 %] 100 %  BP: (110-128)/(69-86) 110/69     Weight: 75.6 kg (166 lb 11.2 oz)  Body mass index is 21.99 kg/m².     Physical Exam  Vitals reviewed.   Constitutional:       General: He is not in acute distress.     Appearance: Normal appearance. He is normal weight. He is not ill-appearing, toxic-appearing or diaphoretic.   HENT:      Head: Normocephalic and atraumatic.      Right Ear: External ear normal.      Left Ear: External ear normal.      Nose: Nose normal. No congestion or rhinorrhea.      Mouth/Throat:      Mouth: Mucous membranes are moist.      Pharynx: Oropharynx is clear. No oropharyngeal exudate or posterior oropharyngeal erythema.   Eyes:      General: No scleral icterus.     Extraocular Movements: Extraocular movements intact.      Conjunctiva/sclera: Conjunctivae normal.      Pupils: Pupils are equal, round, and reactive to light.   Neck:      Vascular: No carotid bruit.   Cardiovascular:      Rate and Rhythm: Normal rate and regular rhythm.      Pulses: Normal pulses.      Heart sounds: Normal heart sounds. No murmur heard.     No friction rub. No gallop.   Pulmonary:      Effort: Pulmonary effort is normal. No respiratory distress.      Breath sounds: Normal breath sounds. No stridor. No wheezing, rhonchi or rales.   Chest:      Chest wall: No tenderness.   Abdominal:      General: Abdomen is flat. Bowel sounds are normal. There is no distension.      Palpations: Abdomen is soft. There is no mass.      Tenderness: There is no abdominal tenderness. There is no guarding or rebound.      Hernia: No hernia is present.   Musculoskeletal:         General: No swelling, tenderness, deformity or signs of  injury. Normal range of motion.      Cervical back: Normal range of motion and neck supple. No rigidity or tenderness.   Lymphadenopathy:      Cervical: No cervical adenopathy.   Skin:     General: Skin is warm and dry.      Capillary Refill: Capillary refill takes less than 2 seconds.      Coloration: Skin is not jaundiced or pale.      Findings: No bruising, erythema, lesion or rash.   Neurological:      General: No focal deficit present.      Mental Status: He is alert and oriented to person, place, and time. Mental status is at baseline.      Cranial Nerves: No cranial nerve deficit.      Sensory: No sensory deficit.      Motor: No weakness.      Coordination: Coordination normal.   Psychiatric:         Mood and Affect: Mood normal.         Behavior: Behavior normal.         Thought Content: Thought content normal.         Judgment: Judgment normal.              CRANIAL NERVES     CN III, IV, VI   Pupils are equal, round, and reactive to light.       Significant Labs: All pertinent labs within the past 24 hours have been reviewed.    Significant Imaging: I have reviewed all pertinent imaging results/findings within the past 24 hours.    LABS:  Recent Results (from the past 24 hours)   CBC auto differential    Collection Time: 11/03/24  3:59 AM   Result Value Ref Range    WBC 7.05 3.90 - 12.70 K/uL    RBC 5.53 4.60 - 6.20 M/uL    Hemoglobin 15.5 14.0 - 18.0 g/dL    Hematocrit 46.2 40.0 - 54.0 %    MCV 84 82 - 98 fL    MCH 28.0 27.0 - 31.0 pg    MCHC 33.5 32.0 - 36.0 g/dL    RDW 17.4 (H) 11.5 - 14.5 %    Platelets 242 150 - 450 K/uL    MPV 9.0 (L) 9.2 - 12.9 fL    Immature Granulocytes 0.1 0.0 - 0.5 %    Gran # (ANC) 2.3 1.8 - 7.7 K/uL    Immature Grans (Abs) 0.01 0.00 - 0.04 K/uL    Lymph # 3.8 1.0 - 4.8 K/uL    Mono # 0.6 0.3 - 1.0 K/uL    Eos # 0.2 0.0 - 0.5 K/uL    Baso # 0.08 0.00 - 0.20 K/uL    nRBC 0 0 /100 WBC    Gran % 33.0 (L) 38.0 - 73.0 %    Lymph % 54.2 (H) 18.0 - 48.0 %    Mono % 8.9 4.0 - 15.0 %     Eosinophil % 2.7 0.0 - 8.0 %    Basophil % 1.1 0.0 - 1.9 %    Differential Method Automated    Comprehensive metabolic panel    Collection Time: 11/03/24  3:59 AM   Result Value Ref Range    Sodium 144 136 - 145 mmol/L    Potassium 3.7 3.5 - 5.1 mmol/L    Chloride 103 95 - 110 mmol/L    CO2 30 (H) 23 - 29 mmol/L    Glucose 90 70 - 110 mg/dL    BUN 8 6 - 20 mg/dL    Creatinine 1.3 0.5 - 1.4 mg/dL    Calcium 10.0 8.7 - 10.5 mg/dL    Total Protein 7.1 6.0 - 8.4 g/dL    Albumin 4.0 3.5 - 5.2 g/dL    Total Bilirubin 0.5 0.1 - 1.0 mg/dL    Alkaline Phosphatase 87 40 - 150 U/L    AST 30 10 - 40 U/L    ALT 32 10 - 44 U/L    eGFR >60 >60 mL/min/1.73 m^2    Anion Gap 11 8 - 16 mmol/L   Troponin I #1    Collection Time: 11/03/24  3:59 AM   Result Value Ref Range    Troponin I <0.006 0.000 - 0.026 ng/mL   Troponin I #2    Collection Time: 11/03/24  3:59 AM   Result Value Ref Range    Troponin I <0.006 0.000 - 0.026 ng/mL   BNP    Collection Time: 11/03/24  3:59 AM   Result Value Ref Range    BNP 12 0 - 99 pg/mL   Lactic Acid, Plasma    Collection Time: 11/03/24  4:25 AM   Result Value Ref Range    Lactate (Lactic Acid) 2.0 0.5 - 2.2 mmol/L   Type & Screen    Collection Time: 11/03/24  4:25 AM   Result Value Ref Range    Group & Rh AB POS     Indirect Susana NEG     Specimen Outdate 11/06/2024 23:59    Ethanol    Collection Time: 11/03/24  4:25 AM   Result Value Ref Range    Alcohol, Serum <10 <10 mg/dL   Urinalysis, Reflex to Urine Culture Urine, Clean Catch    Collection Time: 11/03/24  4:43 AM    Specimen: Urine   Result Value Ref Range    Specimen UA Urine, Clean Catch     Color, UA Yellow Yellow, Straw, Tasia    Appearance, UA Clear Clear    pH, UA 7.0 5.0 - 8.0    Specific Gravity, UA 1.025 1.005 - 1.030    Protein, UA Trace (A) Negative    Glucose, UA Negative Negative    Ketones, UA Negative Negative    Bilirubin (UA) Negative Negative    Occult Blood UA Negative Negative    Nitrite, UA Negative Negative    Urobilinogen,  UA 2.0-3.0 (A) <2.0 EU/dL    Leukocytes, UA Negative Negative   Drug screen panel, in-house    Collection Time: 11/03/24  4:43 AM   Result Value Ref Range    Benzodiazepines Negative Negative    Methadone metabolites Negative Negative    Cocaine (Metab.) Negative Negative    Opiate Scrn, Ur Negative Negative    Barbiturate Screen, Ur Negative Negative    Amphetamine Screen, Ur Negative Negative    THC Negative Negative    Phencyclidine Negative Negative    Creatinine, Urine 275.0 23.0 - 375.0 mg/dL    Toxicology Information SEE COMMENT        RADIOLOGY  No results found.    EKG    MICROBIOLOGY    [facility]    Assessment/Plan:     * Chest pain  Patient presented with substernal chest pain initially activating code STEMI in the ED. Cardiology consulted and review EKG and canceled code STEMI as chest pain improved with conservative management and troponin negative x1.  Recommendations were to obtain UDS given previous drug history and admitted for continue cardiac monitoring inpatient will be evaluated in the morning.  Patient currently chest pain-free.  Repeat troponin pending.  Chest pain likely secondary to drinking numerous monster energy drinks has reported at bedside.  Plan:  -NPO  -telemetry  -trend troponin   -serial EKGs at onset/worsening chest pain  -ELIEL therapy prn  -f/u cardiology       Schizophrenia    Bipolar 1 disorder, mixed  Patient with known history of bipolar disorder and schizophrenia follows outpatient with Psychiatry as noted below.  Patient currently without suicidal/homicidal ideations and reports he does not take below listed medications however does continued to take his monthly injections.      Jessica Madden NP on 5/29/24  1.Continue trileptal 600 mg po bid  2. Continue Perseris 120 mg IM q 28 days  3. Continue trazodone 100 mg po q hs  4. Discontinue effexor xr 37.5 mg po q daily  5. Continue melatonin 10 mg po q hs  6. Continue vistaril 25 mg bid prn anxiety    Plan:  -continue to monitor    -f/u outpatient as directed       Currently asymptomatic HIV infection, with history of HIV-related illness  Patient currently asymptomatic. Follows with provider outpatient. Last clinic note from 5/7/24 as noted below.  Plan:  -current asymptomatic HIV infection, no obvious HIV-associated opportunistic infections.    -He does have a history of AIDS, reports that he was last reportedly undetectable 2 months ago.    -Will continue home Symtuza.       HLD (hyperlipidemia)  Patient is chronically on statin but non-compliant.will not continue for now. Last Lipid Panel:   Lab Results   Component Value Date    CHOL 143 03/25/2024    HDL 59 (L) 03/25/2024    LDLCALC 70 03/25/2024    TRIG 72 03/25/2024   Plan:  -low fat/low calorie diet        VTE Risk Mitigation (From admission, onward)           Ordered     enoxaparin injection 40 mg  Daily         11/03/24 0535     IP VTE HIGH RISK PATIENT  Once         11/03/24 0535     Place sequential compression device  Until discontinued         11/03/24 0535                  //Core Measures   -DVT proph: SCDs, Lovenox   -Code status: Full    -Surrogate: none provided       Components of this note were documented using a voice recognition system and are subject to errors not corrected at the time the document was proof read. Please contact the author for any clarifications.       On 11/03/2024, patient should be placed in hospital observation services under my care.       Leandro Gustafson MD  Department of Hospital Medicine  [facility] - Emergency Dept.

## 2024-11-03 NOTE — DISCHARGE SUMMARY
[facility]  Discharge Summary      Patient Name: Paul Goss Jr.  MRN: [id number]  GERARDO: [id number]  Patient Class: OP- Observation  Admission Date: 11/3/2024  Hospital Length of Stay: 0 days  Discharge Date and Time: 11/3/2024  9:06 AM  Attending Physician: Sophia att. providers found   Discharging Provider: Kailyn Haywood MD  Primary Care Provider: Sophia, Primary Doctor    Primary Care Team: Networked reference to record PCT     HPI:   Paul Goss Jr. is a 39 y.o. male with a PMH  has a past medical history of Anxiety disorder, unspecified, Bipolar disorder, Depression, Human immunodeficiency virus (HIV) disease, and Paranoid schizophrenia. who presented to the ED for further evaluation of acute onset substernal/left-sided chest pain which began prior to arrival.  Patient reported drinking a proximally 6 large monster energy drinks throughout the day and one prior to going to sleep and stated the pain woke him up from his sleep. Pain was described as sharp/stabbing in nature, constant, located midsternally and left parasternal without radiation, rated 7/10 in severity, with no known alleviating or aggravating factors noted.  Patient reported experiencing similar symptoms back in December 20, 2023 and stated he underwent cardiac workup at [facility] and was told his heart was normal.  Patient also reported prior history of crack cocaine abuse with last use approximately 6 months prior to admission.  Patient continues to smoke a proximally 2 pack cigarettes daily and states noncompliance with his home medications however does take his monthly injections for treatment of schizophrenia.  Prior to onset of symptoms, patient reported being in his usual state of health with no concerns or complaints.  All other review of systems negative except as noted above.  Initial workup in the ED fairly unremarkable with patient remaining hemodynamically stable,  afebrile without leukocytosis, CBC and CMP within normal limits.  Troponin negative x1.  EKG initially concerning for STEMI however cardiology consulted and canceled code STEMI with recommendations to obtain UDS given prior history of drug abuse as well as admission for continued cardiac observation and treatment with nitro, benzos, and ELIEL therapy for chest pain. Patient admitted to Hospital Medicine under observation and awaiting further evaluation/recommendations from Cardiology in the morning.    PCP: No, Primary Doctor       * No surgery found *      Hospital Course:   The patient presented with chest pain. EKG initially concerning for STEMI however cardiology consulted and canceled code STEMI with recommendations to obtain UDS given prior history of drug abuse as well as admission for continued cardiac observation and treatment with nitro, benzos, and ELIEL therapy for chest pain. Troponin negative.  Patient left AMA.     Goals of Care Treatment Preferences:  Code Status: Full Code         Consults:   Consults (From admission, onward)          Status Ordering Provider     Inpatient consult to Interventional Cardiology  Once        Provider:  Selam Bates MD Acknowledged VERLANDER, LEO JR              Final Active Diagnoses:    Diagnosis Date Noted POA    PRINCIPAL PROBLEM:  Chest pain [R07.9] 11/03/2024 Yes    Bipolar 1 disorder, mixed [F31.60] 11/03/2024 Yes     Chronic    Schizophrenia [F20.9] 11/03/2024 Yes     Chronic    HLD (hyperlipidemia) [E78.5] 11/03/2024 Yes     Chronic    Currently asymptomatic HIV infection, with history of HIV-related illness [B20] 11/03/2024 Yes     Chronic      Problems Resolved During this Admission:       Discharged Condition: against medical advice    Disposition: Left Against Medical Adv*    Follow Up:    Patient Instructions:   No discharge procedures on file.    Significant Diagnostic Studies: Labs: Troponin   Recent Labs   Lab 11/03/24  0359 11/03/24  0758    TROPONINI <0.006  <0.006 0.008     Radiology:   Imaging Results              X-Ray Chest AP Portable (Final result)  Result time 11/03/24 07:41:03      Final result by Misha ManningFritz), MD (11/03/24 07:41:03)                   Impression:      Negative single view chest x-ray.      Electronically signed by: Misha Manning MD  Date:    11/03/2024  Time:    07:41               Narrative:    EXAMINATION:  XR CHEST AP PORTABLE    CLINICAL HISTORY:  <Diagnosis>, Chest Pain;    COMPARISON:  None    FINDINGS:  Heart size is normal. The lung fields are clear. No acute cardiopulmonary infiltrate.                                        Pending Diagnostic Studies:       Procedure Component Value Units Date/Time    IR Heart Cath Images [[id number]]     Order Status: Sent Lab Status: No result            Medications:  None    Indwelling Lines/Drains at time of discharge:   Lines/Drains/Airways       None                   Time spent on the discharge of patient: 31 minutes         Kailyn Haywood MD  Department of Hospital Medicine  [facility] - Telemetry ([facility])

## 2024-11-04 LAB
OHS QRS DURATION: 84 MS
OHS QTC CALCULATION: 433 MS